# Patient Record
Sex: MALE | Race: BLACK OR AFRICAN AMERICAN | URBAN - METROPOLITAN AREA
[De-identification: names, ages, dates, MRNs, and addresses within clinical notes are randomized per-mention and may not be internally consistent; named-entity substitution may affect disease eponyms.]

---

## 2024-09-11 ENCOUNTER — OFFICE VISIT (OUTPATIENT)
Age: 44
End: 2024-09-11
Payer: MEDICAID

## 2024-09-11 VITALS
DIASTOLIC BLOOD PRESSURE: 97 MMHG | HEIGHT: 68 IN | WEIGHT: 177 LBS | OXYGEN SATURATION: 98 % | HEART RATE: 61 BPM | SYSTOLIC BLOOD PRESSURE: 151 MMHG | BODY MASS INDEX: 26.83 KG/M2

## 2024-09-11 DIAGNOSIS — R94.31 ABNORMAL EKG: ICD-10-CM

## 2024-09-11 DIAGNOSIS — I10 HYPERTENSION, UNSPECIFIED TYPE: Primary | ICD-10-CM

## 2024-09-11 PROCEDURE — 99204 OFFICE O/P NEW MOD 45 MIN: CPT | Performed by: INTERNAL MEDICINE

## 2024-09-11 PROCEDURE — 93000 ELECTROCARDIOGRAM COMPLETE: CPT | Performed by: INTERNAL MEDICINE

## 2024-09-11 PROCEDURE — 3080F DIAST BP >= 90 MM HG: CPT | Performed by: INTERNAL MEDICINE

## 2024-09-11 PROCEDURE — 3077F SYST BP >= 140 MM HG: CPT | Performed by: INTERNAL MEDICINE

## 2024-09-11 RX ORDER — AMLODIPINE AND BENAZEPRIL HYDROCHLORIDE 10; 20 MG/1; MG/1
1 CAPSULE ORAL DAILY
COMMUNITY
Start: 2024-08-04

## 2024-09-11 ASSESSMENT — PATIENT HEALTH QUESTIONNAIRE - PHQ9
SUM OF ALL RESPONSES TO PHQ9 QUESTIONS 1 & 2: 0
SUM OF ALL RESPONSES TO PHQ QUESTIONS 1-9: 0
SUM OF ALL RESPONSES TO PHQ QUESTIONS 1-9: 0
2. FEELING DOWN, DEPRESSED OR HOPELESS: NOT AT ALL
1. LITTLE INTEREST OR PLEASURE IN DOING THINGS: NOT AT ALL
SUM OF ALL RESPONSES TO PHQ QUESTIONS 1-9: 0
SUM OF ALL RESPONSES TO PHQ QUESTIONS 1-9: 0

## 2025-01-20 ENCOUNTER — TELEPHONE (OUTPATIENT)
Age: 45
End: 2025-01-20

## 2025-01-20 NOTE — TELEPHONE ENCOUNTER
----- Message from Dr. Eva Ellison DO sent at 1/15/2025 10:48 PM EST -----  His echo is fine, he has a strong normal heart  ----- Message -----  From: Nunu Fitzgerald RN  Sent: 1/14/2025   5:06 PM EST  To: Eva Ellison DO    Per your last office note:    Abnormal EKG  HTN  Family h/o CAD, CHF, HTN     Discussed several options  He agrees with echo- and if WNL can follow up with PCP  I also offered CAC but if echo normal, he prefers to wait